# Patient Record
Sex: FEMALE | Race: WHITE | URBAN - METROPOLITAN AREA
[De-identification: names, ages, dates, MRNs, and addresses within clinical notes are randomized per-mention and may not be internally consistent; named-entity substitution may affect disease eponyms.]

---

## 2022-08-03 ENCOUNTER — OFFICE VISIT (OUTPATIENT)
Dept: URBAN - METROPOLITAN AREA CLINIC 72 | Facility: CLINIC | Age: 32
End: 2022-08-03

## 2022-08-03 DIAGNOSIS — H11.421 CONJUNCTIVAL EDEMA, RIGHT EYE: Primary | ICD-10-CM

## 2022-08-03 PROCEDURE — 99202 OFFICE O/P NEW SF 15 MIN: CPT

## 2022-08-03 RX ORDER — PREDNISOLONE ACETATE 10 MG/ML
1 % SUSPENSION/ DROPS OPHTHALMIC
Qty: 5 | Refills: 0 | Status: ACTIVE
Start: 2022-08-03

## 2022-08-03 ASSESSMENT — INTRAOCULAR PRESSURE
OD: 21
OS: 21
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
